# Patient Record
Sex: MALE | Race: WHITE | NOT HISPANIC OR LATINO | Employment: FULL TIME | URBAN - METROPOLITAN AREA
[De-identification: names, ages, dates, MRNs, and addresses within clinical notes are randomized per-mention and may not be internally consistent; named-entity substitution may affect disease eponyms.]

---

## 2017-01-16 ENCOUNTER — OFFICE VISIT (OUTPATIENT)
Dept: INTERNAL MEDICINE | Facility: CLINIC | Age: 37
End: 2017-01-16

## 2017-01-16 DIAGNOSIS — Z00.00 PHYSICAL EXAM: Primary | ICD-10-CM

## 2017-01-16 PROCEDURE — 99201 PR OFFICE/OUTPT VISIT,NEW,LEVL I: CPT | Mod: ,,, | Performed by: INTERNAL MEDICINE

## 2022-06-29 ENCOUNTER — HISTORICAL (OUTPATIENT)
Dept: ADMINISTRATIVE | Facility: HOSPITAL | Age: 42
End: 2022-06-29

## 2023-07-12 ENCOUNTER — HOSPITAL ENCOUNTER (OUTPATIENT)
Dept: RADIOLOGY | Facility: CLINIC | Age: 43
Discharge: HOME | End: 2023-07-12
Attending: NURSE PRACTITIONER
Payer: COMMERCIAL

## 2023-07-12 ENCOUNTER — OFFICE VISIT (OUTPATIENT)
Dept: UROLOGY | Facility: CLINIC | Age: 43
End: 2023-07-12
Payer: COMMERCIAL

## 2023-07-12 VITALS — RESPIRATION RATE: 18 BRPM | BODY MASS INDEX: 30.78 KG/M2 | HEIGHT: 70 IN | WEIGHT: 215 LBS

## 2023-07-12 DIAGNOSIS — R10.9 RIGHT FLANK PAIN: ICD-10-CM

## 2023-07-12 DIAGNOSIS — N20.0 RENAL STONE: ICD-10-CM

## 2023-07-12 DIAGNOSIS — N20.0 KIDNEY STONE: Primary | ICD-10-CM

## 2023-07-12 DIAGNOSIS — N20.0 KIDNEY STONE: ICD-10-CM

## 2023-07-12 DIAGNOSIS — N20.0 RENAL STONE: Primary | ICD-10-CM

## 2023-07-12 LAB
BILIRUBIN, UA POC OHS: NEGATIVE
BLOOD, UA POC OHS: ABNORMAL
CLARITY, UA POC OHS: ABNORMAL
COLOR, UA POC OHS: ABNORMAL
GLUCOSE, UA POC OHS: NEGATIVE
KETONES, UA POC OHS: NEGATIVE
LEUKOCYTES, UA POC OHS: NEGATIVE
NITRITE, UA POC OHS: NEGATIVE
PH, UA POC OHS: 5.5
PROTEIN, UA POC OHS: 30
SPECIFIC GRAVITY, UA POC OHS: >=1.03
UROBILINOGEN, UA POC OHS: 0.2

## 2023-07-12 PROCEDURE — 1159F PR MEDICATION LIST DOCUMENTED IN MEDICAL RECORD: ICD-10-PCS | Mod: CPTII,S$GLB,, | Performed by: NURSE PRACTITIONER

## 2023-07-12 PROCEDURE — 99214 PR OFFICE/OUTPT VISIT, EST, LEVL IV, 30-39 MIN: ICD-10-PCS | Mod: S$GLB,,, | Performed by: NURSE PRACTITIONER

## 2023-07-12 PROCEDURE — 1160F RVW MEDS BY RX/DR IN RCRD: CPT | Mod: CPTII,S$GLB,, | Performed by: NURSE PRACTITIONER

## 2023-07-12 PROCEDURE — 74018 RADEX ABDOMEN 1 VIEW: CPT | Mod: 26,,, | Performed by: RADIOLOGY

## 2023-07-12 PROCEDURE — 3008F BODY MASS INDEX DOCD: CPT | Mod: CPTII,S$GLB,, | Performed by: NURSE PRACTITIONER

## 2023-07-12 PROCEDURE — 1159F MED LIST DOCD IN RCRD: CPT | Mod: CPTII,S$GLB,, | Performed by: NURSE PRACTITIONER

## 2023-07-12 PROCEDURE — 99214 OFFICE O/P EST MOD 30 MIN: CPT | Mod: S$GLB,,, | Performed by: NURSE PRACTITIONER

## 2023-07-12 PROCEDURE — 81003 URINALYSIS AUTO W/O SCOPE: CPT | Mod: QW,S$GLB,, | Performed by: NURSE PRACTITIONER

## 2023-07-12 PROCEDURE — 74018 RADEX ABDOMEN 1 VIEW: CPT | Mod: 26,,, | Performed by: UROLOGY

## 2023-07-12 PROCEDURE — 74018 XR ABDOMEN AP 1 VIEW: ICD-10-PCS | Mod: 26,,, | Performed by: RADIOLOGY

## 2023-07-12 PROCEDURE — 1160F PR REVIEW ALL MEDS BY PRESCRIBER/CLIN PHARMACIST DOCUMENTED: ICD-10-PCS | Mod: CPTII,S$GLB,, | Performed by: NURSE PRACTITIONER

## 2023-07-12 PROCEDURE — 81003 POCT URINALYSIS(INSTRUMENT): ICD-10-PCS | Mod: QW,S$GLB,, | Performed by: NURSE PRACTITIONER

## 2023-07-12 PROCEDURE — 3008F PR BODY MASS INDEX (BMI) DOCUMENTED: ICD-10-PCS | Mod: CPTII,S$GLB,, | Performed by: NURSE PRACTITIONER

## 2023-07-12 PROCEDURE — 74018 XR ABDOMEN AP 1 VIEW: ICD-10-PCS | Mod: 26,,, | Performed by: UROLOGY

## 2023-07-12 RX ORDER — TAMSULOSIN HYDROCHLORIDE 0.4 MG/1
CAPSULE ORAL
COMMUNITY
Start: 2023-07-10

## 2023-07-12 RX ORDER — ONDANSETRON 4 MG/1
8 TABLET, ORALLY DISINTEGRATING ORAL EVERY 6 HOURS PRN
Qty: 30 TABLET | Refills: 0 | Status: SHIPPED | OUTPATIENT
Start: 2023-07-12

## 2023-07-12 RX ORDER — ESCITALOPRAM OXALATE 20 MG/1
20 TABLET ORAL
COMMUNITY
Start: 2023-06-26

## 2023-07-12 RX ORDER — MELOXICAM 15 MG/1
15 TABLET ORAL DAILY
Qty: 15 TABLET | Refills: 0 | Status: SHIPPED | OUTPATIENT
Start: 2023-07-12 | End: 2023-08-12 | Stop reason: ALTCHOICE

## 2023-07-12 NOTE — PROGRESS NOTES
Subjective:       Patient ID: Remi Cabrera is a 42 y.o. male.    Chief Complaint: New Pt and Nephrolithiasis      HPI: 42-year-old male past patient Dr. French history kidney stones presents today with new onset stone type pain right lower quadrant.  Onset Monday.  Saw his primary care gave him tamsulosin 0.4 mg daily referred him back to us.  He presents today continues with intermittent right lower quadrant pain states more so in the evening than daytime.  Afebrile not seeing any blood no dysuria no other urologic concerns.       Past Medical History:   Past Medical History:   Diagnosis Date    Renal stones        Past Surgical Historical:   Past Surgical History:   Procedure Laterality Date    HERNIA REPAIR      as child    URETEROLITHOTOMY          Medications:   Medication List with Changes/Refills   Current Medications    ESCITALOPRAM OXALATE (LEXAPRO) 20 MG TABLET    Take 20 mg by mouth.    TAMSULOSIN (FLOMAX) 0.4 MG CAP    TAKE 1 CAPSULE DAILY FOR 14 DAYS        Past Social History:   Social History     Socioeconomic History    Marital status:    Tobacco Use    Smoking status: Never    Smokeless tobacco: Never   Substance and Sexual Activity    Alcohol use: Not Currently       Allergies:   Review of patient's allergies indicates:   Allergen Reactions    Hydrocodone-acetaminophen         Family History: History reviewed. No pertinent family history.     Review of Systems:  Review of Systems   Constitutional:  Negative for activity change and appetite change.   HENT:  Negative for congestion and dental problem.    Eyes:  Negative for visual disturbance.   Respiratory:  Negative for chest tightness and shortness of breath.    Cardiovascular:  Negative for chest pain.   Gastrointestinal:  Negative for abdominal distention and abdominal pain.   Genitourinary:  Positive for flank pain. Negative for decreased urine volume, difficulty urinating, dysuria, enuresis, frequency, genital sores, hematuria,  penile discharge, penile pain, penile swelling, scrotal swelling, testicular pain and urgency.   Musculoskeletal:  Negative for back pain and neck pain.   Skin:  Negative for color change.   Neurological:  Negative for dizziness.   Hematological:  Negative for adenopathy.   Psychiatric/Behavioral:  Negative for agitation, behavioral problems and confusion.      Physical Exam:  Physical Exam  Constitutional:       Appearance: He is well-developed.   HENT:      Head: Normocephalic.   Eyes:      General: No scleral icterus.  Pulmonary:      Effort: Pulmonary effort is normal.      Breath sounds: Normal breath sounds.   Abdominal:      General: There is no distension.      Palpations: Abdomen is soft.      Tenderness: There is no abdominal tenderness.      Hernia: No hernia is present. There is no hernia in the right inguinal area or left inguinal area.   Genitourinary:     Penis: Normal.       Testes: Normal. Cremasteric reflex is present.   Musculoskeletal:      Cervical back: Normal range of motion.   Skin:     General: Skin is warm and dry.   Neurological:      Mental Status: He is alert and oriented to person, place, and time.       Assessment/Plan:   Right flan librado--urinalysis  or be sees otherwise negative.  KUB--right ureteral stone at approximately L5.  Rx Mobic patient does not tolerate narcotics, continue tamsulosin as well as an Rx for Zofran.  Patient is leaving town tomorrow on vacation.  He will return on the 24th of this month at which time I gave him a written order for a KUB at the [facility] will review the film and if stone is not passed will set him up for ureteroscopy at that time.  Problem List Items Addressed This Visit    None  Visit Diagnoses       Renal stone    -  Primary    Relevant Orders    POCT Urinalysis(Instrument)    X-Ray Abdomen AP 1 View    Right flank pain        Relevant Orders    POCT Urinalysis(Instrument)    X-Ray Abdomen AP 1 View

## 2023-07-25 ENCOUNTER — TELEPHONE (OUTPATIENT)
Dept: UROLOGY | Facility: CLINIC | Age: 43
End: 2023-07-25
Payer: COMMERCIAL

## 2023-07-25 ENCOUNTER — HOSPITAL ENCOUNTER (OUTPATIENT)
Dept: RADIOLOGY | Facility: HOSPITAL | Age: 43
Discharge: HOME | End: 2023-07-25
Attending: NURSE PRACTITIONER
Payer: COMMERCIAL

## 2023-07-25 DIAGNOSIS — N20.0 STONE, KIDNEY: ICD-10-CM

## 2023-07-25 PROCEDURE — 74018 RADEX ABDOMEN 1 VIEW: CPT | Mod: TC

## 2023-07-25 NOTE — TELEPHONE ENCOUNTER
Pt calling to get results for KUB. Pt wants to know if he can go to work tomorrow. 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 Informed pt I would send to provider. ANI

## 2023-07-25 NOTE — TELEPHONE ENCOUNTER
----- Message from Erika Clancy sent at 7/25/2023  3:36 PM CDT -----  Patient is calling in regards to if he able to leave for work in the morning need clearance..Please call him back at [phone]

## 2023-07-25 NOTE — TELEPHONE ENCOUNTER
----- Message from Erika Clancy sent at 7/25/2023 11:23 AM CDT -----  Patient is calling in regards to he has completed his X-ray...Please call him back at [phone]

## 2023-08-12 ENCOUNTER — HOSPITAL ENCOUNTER (EMERGENCY)
Facility: HOSPITAL | Age: 43
Discharge: HOME | End: 2023-08-12
Payer: COMMERCIAL

## 2023-08-12 ENCOUNTER — NURSE TRIAGE (OUTPATIENT)
Dept: ADMINISTRATIVE | Facility: CLINIC | Age: 43
End: 2023-08-12
Payer: COMMERCIAL

## 2023-08-12 VITALS
RESPIRATION RATE: 18 BRPM | DIASTOLIC BLOOD PRESSURE: 87 MMHG | TEMPERATURE: 99 F | HEIGHT: 71 IN | SYSTOLIC BLOOD PRESSURE: 145 MMHG | WEIGHT: 215 LBS | OXYGEN SATURATION: 98 % | BODY MASS INDEX: 30.1 KG/M2 | HEART RATE: 89 BPM

## 2023-08-12 DIAGNOSIS — N20.1 URETEROLITHIASIS: Primary | ICD-10-CM

## 2023-08-12 PROCEDURE — 99285 EMERGENCY DEPT VISIT HI MDM: CPT | Mod: 25

## 2023-08-12 PROCEDURE — 63600175 PHARM REV CODE 636 W HCPCS

## 2023-08-12 PROCEDURE — 96374 THER/PROPH/DIAG INJ IV PUSH: CPT

## 2023-08-12 RX ORDER — NAPROXEN 500 MG/1
500 TABLET ORAL 2 TIMES DAILY
Qty: 20 TABLET | Refills: 0 | Status: SHIPPED | OUTPATIENT
Start: 2023-08-12

## 2023-08-12 RX ORDER — KETOROLAC TROMETHAMINE 30 MG/ML
30 INJECTION, SOLUTION INTRAMUSCULAR; INTRAVENOUS
Status: COMPLETED | OUTPATIENT
Start: 2023-08-12 | End: 2023-08-12

## 2023-08-12 RX ORDER — OXYCODONE AND ACETAMINOPHEN 10; 325 MG/1; MG/1
1 TABLET ORAL EVERY 4 HOURS PRN
Qty: 12 TABLET | Refills: 0 | Status: SHIPPED | OUTPATIENT
Start: 2023-08-12

## 2023-08-12 RX ADMIN — KETOROLAC TROMETHAMINE 30 MG: 30 INJECTION, SOLUTION INTRAMUSCULAR; INTRAVENOUS at 03:08

## 2023-08-12 NOTE — ED PROVIDER NOTES
Encounter Date: 8/12/2023       History     Chief Complaint   Patient presents with    Flank Pain     Patient states right sided flank pain and urinary retention. Patient states hx kidney stones.      42-year-old male with a history of kidney stones presents complaining of sudden onset right flank pain 4 hours ago.  Denies any hematuria or dysuria.  Denies any fever or chills.  There has been no vomiting or diarrhea.    The history is provided by the patient.     Review of patient's allergies indicates:   Allergen Reactions    Hydrocodone-acetaminophen      Past Medical History:   Diagnosis Date    Renal stones      Past Surgical History:   Procedure Laterality Date    HERNIA REPAIR      as child    URETEROLITHOTOMY       History reviewed. No pertinent family history.  Social History     Tobacco Use    Smoking status: Never    Smokeless tobacco: Never   Substance Use Topics    Alcohol use: Not Currently     Review of Systems   Constitutional:  Negative for chills and fever.   HENT:  Negative for sore throat.    Respiratory:  Negative for shortness of breath.    Cardiovascular:  Negative for chest pain.   Gastrointestinal:  Negative for nausea.   Genitourinary:  Positive for flank pain. Negative for dysuria, frequency and hematuria.   Musculoskeletal:  Negative for back pain.   Skin:  Negative for rash.   Neurological:  Negative for weakness.   Hematological:  Does not bruise/bleed easily.   All other systems reviewed and are negative.      Physical Exam     Initial Vitals [08/12/23 0347]   BP Pulse Resp Temp SpO2   (!) 153/93 71 18 99.1 °F (37.3 °C) 98 %      MAP       --         Physical Exam    Nursing note and vitals reviewed.  Constitutional: Vital signs are normal. He appears well-developed and well-nourished. He is cooperative.   HENT:   Head: Normocephalic and atraumatic.   Eyes: Conjunctivae, EOM and lids are normal. Pupils are equal, round, and reactive to light.   Neck: Trachea normal. Neck supple.    Normal range of motion.  Cardiovascular:  Normal rate, regular rhythm, normal heart sounds and intact distal pulses.           Pulmonary/Chest: Breath sounds normal.   Abdominal: Abdomen is soft. Bowel sounds are normal.   Musculoskeletal:         General: Normal range of motion.      Cervical back: Normal, normal range of motion and neck supple.      Lumbar back: Normal.     Neurological: He is alert and oriented to person, place, and time. He has normal strength. Coordination normal.   Skin: Skin is warm, dry and intact. Capillary refill takes less than 2 seconds.   Psychiatric: He has a normal mood and affect. His speech is normal and behavior is normal. Judgment and thought content normal. Cognition and memory are normal.         ED Course   Procedures  Labs Reviewed   URINALYSIS, REFLEX TO URINE CULTURE          Imaging Results              CT Abdomen Pelvis  Without Contrast (Final result)  Result time 08/12/23 04:06:47      Final result by Mary Kay Helm III, MD (08/12/23 04:06:47)                   Impression:      1. A 5-6 mm, radiopaque stone is noted within the distal most aspect of the right ureter (UVJ) with what appears to be at least a mid grade obstruction.  See above comments.      Electronically signed by: Mary Kay Helm  Date:    08/12/2023  Time:    04:06               Narrative:    EXAMINATION:  CT ABDOMEN PELVIS WITHOUT CONTRAST    CLINICAL HISTORY AND TECHNIQUE:  Kevin Kee, RT on 8/12/2023  4:04 AM    PROCEDURE: CT ABD/PEL WO CONT    CLINICAL HX: ER    RT FLANK PAIN AND URINARY RETENTION    PMH: HX OF KIDNEY STONES - UNSPECIFIED HERNIA REPAIR    IV CONTRAST: NONE    ORAL CONTRAST: NONE    RECTAL CONTRAST: NONE    AXIAL IMAGING @ 5MM INTERVALS W/MULTIPLANAR RECONSTRUCTION OF IMAGES    TOTAL IMAGE NUMBER: [id number]    CTDIvol(mGy): HEAD:  BODY:6.9    DLP(mGycm): HEAD:  BODY: 445    # CT'S LAST 12 MONTHS:1    TECH: AJR    PT TRANSPORTED W/O INCIDENT    This patient has had 1 CT and nuclear medicine  scans performed within the last 12 months.    The following DOSE REDUCTION TECHNIQUES are used for all CT scans at [facility]:    1. Automated exposure control.  2. Adjustment of the mA and/or kv according to patient size.  3. Use of iterative reconstruction technique.    COMPARISON:  None    FINDINGS:  Liver: No clinically significant abnormalities are noted.    Gallbladder/biliary system: No clinically significant abnormalities are noted.    Spleen: No clinically significant abnormalities are noted.    Adrenal glands: No clinically significant abnormalities are noted.    Pancreas: No clinically significant abnormalities are noted.    Kidneys/ureters: 5-6 mm radiopaque stone is noted within the distal most aspect of the right ureter (level of the UVJ) with moderate dilatation of the right ureter and pelvicaliceal system compatible with at least a mid grade obstruction.    Urinary bladder: No clinically significant abnormalities are noted.    Prostate gland and seminal vesicles: No significant abnormalities are noted.    GI tract: Unopacified loops of large and small bowel as well as the gastric lumen and appendix are difficult to evaluate with no definite abnormalities noted.    Vascular structures: No clinically significant abnormalities are noted.    Musculoskeletal structures: A mild, levoconvex, scoliotic curvature of the lumbar spine is present with mild degenerative changes noted involving the lumbar spine.    Miscellaneous: N/A                                       Medications   ketorolac injection 30 mg (30 mg Intravenous Given 8/12/23 0353)     Medical Decision Making:   Initial Assessment:   Right flank pain x4 hours, history kidney stones  Differential Diagnosis:   Ureterolithiasis  ED Management:  Toradol IV  CT abdomen pelvis, UA             ED Course as of 08/12/23 0412   Sat Aug 12, 2023   0407 CT Abdomen Pelvis  Without Contrast  5-6 mm right UVJ stone [TM]      ED Course User  Index  [TM] Akhil Lowery MD                 Clinical Impression:   Final diagnoses:  [N20.1] Ureterolithiasis (Primary)        ED Disposition Condition    Discharge Good          ED Prescriptions       Medication Sig Dispense Start Date End Date Auth. Provider    naproxen (NAPROSYN) 500 MG tablet Take 1 tablet (500 mg total) by mouth 2 (two) times daily. 20 tablet 8/12/2023 -- Akhil Lowery MD    oxyCODONE-acetaminophen (PERCOCET)  mg per tablet Take 1 tablet by mouth every 4 (four) hours as needed for Pain. 12 tablet 8/12/2023 -- Akhil Lowery MD          Follow-up Information       Follow up With Specialties Details Why Contact Info    Sreekanth Barkley MD Family Medicine Call in 2 days  [address]  [facility] [address]  [phone]               Akhil Lowery MD  08/12/23 3218

## 2023-08-12 NOTE — TELEPHONE ENCOUNTER
"[place]    PCP:  Dr. Sreekanth Barkley    Pt escalated to [facility] queue for "KIDNEY STONES. WENT ER LAST NIGHT. ER TOLD HIM THAT HIS KIDNEY IS SWOLLEN".  Spoke with Wife, Veronika Cabrera, on pts behalf and with pt directly.  Wife reports seen in the ED today for kidney stone that is blocking the ureter and Rt kidney is enlarged.  C/O Rt flank/abdominal pain rated 100/10 and urinary retention.  Denies blood in urine, vomiting, leg weakness, burning with urination, and fever.  Per protocol, care advised is go to the ED now.  Pt and Wife VU.  Advised to call for worsening/questions/concerns.  VU.     Reason for Disposition   [1] SEVERE pain (e.g., excruciating, scale 8-10) AND [2] present > 1 hour    Additional Information   Negative: Passed out (i.e., lost consciousness, collapsed and was not responding)   Negative: Shock suspected (e.g., cold/pale/clammy skin, too weak to stand, low BP, rapid pulse)   Negative: Difficult to awaken or acting confused (e.g., disoriented, slurred speech)   Negative: Sounds like a life-threatening emergency to the triager    Protocols used: Flank Pain-A-AH    "

## 2023-08-14 ENCOUNTER — TELEPHONE (OUTPATIENT)
Dept: UROLOGY | Facility: CLINIC | Age: 43
End: 2023-08-14
Payer: COMMERCIAL

## 2023-08-14 NOTE — TELEPHONE ENCOUNTER
Called pt to check in since pt was last seen in the ED on 812/2023. Will try to contact pt again to check in. ED

## 2023-08-14 NOTE — TELEPHONE ENCOUNTER
Patient states that about 5 weeks ago he had a kub with you and a stone was seen, then he had a follow up x-ray which didn't show it so you guys thought he passed it. Well he recently went to [facility] on 8/12/23 and had a CT scan which showed the stone is still there, and it's causing a lot of discomfort for him. He would like to have a ESWL or a procedure that isn't done by going through penile entrance.

## 2023-08-14 NOTE — TELEPHONE ENCOUNTER
----- Message from Joey Deleon sent at 8/14/2023  8:50 AM CDT -----  Contact: Remi Khalil is needing a call back in regards to some concerns. Please give him a call back at [phone]

## 2023-08-15 ENCOUNTER — OFFICE VISIT (OUTPATIENT)
Dept: UROLOGY | Facility: CLINIC | Age: 43
End: 2023-08-15
Payer: COMMERCIAL

## 2023-08-15 VITALS
DIASTOLIC BLOOD PRESSURE: 70 MMHG | HEIGHT: 71 IN | WEIGHT: 215 LBS | BODY MASS INDEX: 30.1 KG/M2 | SYSTOLIC BLOOD PRESSURE: 123 MMHG | HEART RATE: 68 BPM

## 2023-08-15 DIAGNOSIS — N20.0 RENAL STONE: Primary | ICD-10-CM

## 2023-08-15 DIAGNOSIS — N20.0 KIDNEY STONE: ICD-10-CM

## 2023-08-15 DIAGNOSIS — N20.0 KIDNEY STONE: Primary | ICD-10-CM

## 2023-08-15 DIAGNOSIS — N20.0 RENAL STONE: ICD-10-CM

## 2023-08-15 PROCEDURE — 1159F PR MEDICATION LIST DOCUMENTED IN MEDICAL RECORD: ICD-10-PCS | Mod: CPTII,S$GLB,, | Performed by: UROLOGY

## 2023-08-15 PROCEDURE — 3074F PR MOST RECENT SYSTOLIC BLOOD PRESSURE < 130 MM HG: ICD-10-PCS | Mod: CPTII,S$GLB,, | Performed by: UROLOGY

## 2023-08-15 PROCEDURE — 3008F PR BODY MASS INDEX (BMI) DOCUMENTED: ICD-10-PCS | Mod: CPTII,S$GLB,, | Performed by: UROLOGY

## 2023-08-15 PROCEDURE — 3078F PR MOST RECENT DIASTOLIC BLOOD PRESSURE < 80 MM HG: ICD-10-PCS | Mod: CPTII,S$GLB,, | Performed by: UROLOGY

## 2023-08-15 PROCEDURE — 1159F MED LIST DOCD IN RCRD: CPT | Mod: CPTII,S$GLB,, | Performed by: UROLOGY

## 2023-08-15 PROCEDURE — 99215 OFFICE O/P EST HI 40 MIN: CPT | Mod: S$GLB,,, | Performed by: UROLOGY

## 2023-08-15 PROCEDURE — 99215 PR OFFICE/OUTPT VISIT, EST, LEVL V, 40-54 MIN: ICD-10-PCS | Mod: S$GLB,,, | Performed by: UROLOGY

## 2023-08-15 PROCEDURE — 3074F SYST BP LT 130 MM HG: CPT | Mod: CPTII,S$GLB,, | Performed by: UROLOGY

## 2023-08-15 PROCEDURE — 3008F BODY MASS INDEX DOCD: CPT | Mod: CPTII,S$GLB,, | Performed by: UROLOGY

## 2023-08-15 PROCEDURE — 3078F DIAST BP <80 MM HG: CPT | Mod: CPTII,S$GLB,, | Performed by: UROLOGY

## 2023-08-15 RX ORDER — KETOROLAC TROMETHAMINE 10 MG/1
TABLET, FILM COATED ORAL
COMMUNITY
Start: 2023-08-12

## 2023-08-15 RX ORDER — OXYCODONE AND ACETAMINOPHEN 7.5; 325 MG/1; MG/1
1 TABLET ORAL EVERY 6 HOURS PRN
COMMUNITY
Start: 2023-08-12

## 2023-08-15 NOTE — TELEPHONE ENCOUNTER
Spoke with pt, went to [facility] first, ct report in epic. Went [facility] after that. I advised we could not view those results. Pt has appointment today with Dr Linn. I advised Dr Linn would see pt and go from there.

## 2023-08-15 NOTE — PROGRESS NOTES
Subjective:       Patient ID: Remi Cabrera is a 42 y.o. male.    Chief Complaint: Nephrolithiasis      HPI: 42-year-old male patient presenting to the clinic of right flank and lower abdominal pain.  He has history of stones requiring ureteroscopy. Patient has been hurting for 5 weeks. Recent CT imaging done on 08/12/2020 notes a 5-6 mm stone in the distal right ureter at the UVJ with mid grade obstruction.        Past Medical History:   Past Medical History:   Diagnosis Date    Renal stones        Past Surgical Historical:   Past Surgical History:   Procedure Laterality Date    HERNIA REPAIR      as child    URETEROLITHOTOMY          Medications:   Medication List with Changes/Refills   Current Medications    ESCITALOPRAM OXALATE (LEXAPRO) 20 MG TABLET    Take 20 mg by mouth.    KETOROLAC (TORADOL) 10 MG TABLET    TAKE 1 TABLET BY MOUTH THREE TIMES A DAY AS NEEDED FOR PAIN FOR 5 DAYS    NAPROXEN (NAPROSYN) 500 MG TABLET    Take 1 tablet (500 mg total) by mouth 2 (two) times daily.    ONDANSETRON (ZOFRAN-ODT) 4 MG TBDL    Take 2 tablets (8 mg total) by mouth every 6 (six) hours as needed (N/V).    OXYCODONE-ACETAMINOPHEN (PERCOCET)  MG PER TABLET    Take 1 tablet by mouth every 4 (four) hours as needed for Pain.    OXYCODONE-ACETAMINOPHEN (PERCOCET) 7.5-325 MG PER TABLET    Take 1 tablet by mouth every 6 (six) hours as needed.    TAMSULOSIN (FLOMAX) 0.4 MG CAP    TAKE 1 CAPSULE DAILY FOR 14 DAYS        Past Social History:   Social History     Socioeconomic History    Marital status:    Tobacco Use    Smoking status: Never    Smokeless tobacco: Never   Substance and Sexual Activity    Alcohol use: Not Currently       Allergies:   Review of patient's allergies indicates:   Allergen Reactions    Hydrocodone      Other reaction(s): makes pt mean    Hydrocodone-acetaminophen         Family History: History reviewed. No pertinent family history.     Review of Systems:  Review of Systems   Constitutional:  Negative.    HENT: Negative.     Eyes: Negative.    Respiratory: Negative.     Cardiovascular: Negative.    Gastrointestinal: Negative.    Endocrine: Negative.    Genitourinary:  Positive for flank pain and hematuria.   Skin: Negative.    Allergic/Immunologic: Negative.    Neurological: Negative.    Hematological: Negative.    Psychiatric/Behavioral: Negative.         Physical Exam:  Physical Exam  Constitutional:       Appearance: He is normal weight.   HENT:      Head: Normocephalic.      Nose: Nose normal.      Mouth/Throat:      Mouth: Mucous membranes are moist.      Pharynx: Oropharynx is clear.   Eyes:      Extraocular Movements: Extraocular movements intact.      Conjunctiva/sclera: Conjunctivae normal.      Pupils: Pupils are equal, round, and reactive to light.   Cardiovascular:      Rate and Rhythm: Normal rate and regular rhythm.      Pulses: Normal pulses.      Heart sounds: Normal heart sounds.   Pulmonary:      Effort: Pulmonary effort is normal.      Breath sounds: Normal breath sounds.   Abdominal:      General: Abdomen is flat. Bowel sounds are normal.      Palpations: Abdomen is soft.      Hernia: There is no hernia in the right inguinal area or left inguinal area.   Genitourinary:     Penis: Normal. No phimosis, paraphimosis, hypospadias, erythema, tenderness or discharge.       Testes: Normal.         Right: Mass, tenderness or swelling not present. Right testis is descended. Cremasteric reflex is present.          Left: Mass, tenderness or swelling not present. Left testis is descended. Cremasteric reflex is present.       Prostate: Normal.      Rectum: Normal.   Musculoskeletal:         General: Normal range of motion.      Cervical back: Normal range of motion and neck supple.   Lymphadenopathy:      Lower Body: No right inguinal adenopathy. No left inguinal adenopathy.   Skin:     General: Skin is warm and dry.      Capillary Refill: Capillary refill takes less than 2 seconds.    Neurological:      General: No focal deficit present.      Mental Status: He is alert and oriented to person, place, and time. Mental status is at baseline.   Psychiatric:         Mood and Affect: Mood normal.         Behavior: Behavior normal.         Thought Content: Thought content normal.         Judgment: Judgment normal.         Assessment/Plan:     Nephrolithiasis:  Patient will be set up for right ureteroscopy at the next available date. Recent CT imaging done on 08/12/2020 notes a 5-6 mm stone in the distal right ureter at the UVJ with mid grade obstruction. Patient denies any fever at this time. He has medication for pain control.     I, Dr. Braydon Linn have seen and personally evaluated the patient. I have formulated the plan reviewed all pertinent imaging and clinical data.  I agree with the nurse practitioner's assessment, and I have personally formulated the plan for this patient's care as described by the midlevel.    Problem List Items Addressed This Visit    None

## 2023-08-15 NOTE — PROGRESS NOTES
Patient educated, consent signed, pre-admission paperwork given. Patient verbalized understanding. DOS 8/17/23 at [facility]. Cathy

## 2023-08-16 LAB
ANION GAP SERPL CALC-SCNC: 10 MMOL/L (ref 3–11)
APPEARANCE, UA: CLEAR
BACTERIA SPEC CULT: ABNORMAL /HPF
BASOPHILS NFR BLD: 0.5 % (ref 0–3)
BILIRUB UR QL STRIP: NEGATIVE MG/DL
BUN SERPL-MCNC: 16 MG/DL (ref 7–18)
BUN/CREAT SERPL: 15.38 RATIO (ref 7–18)
CALCIUM SERPL-MCNC: 8.7 MG/DL (ref 8.8–10.5)
CHLORIDE SERPL-SCNC: 107 MMOL/L (ref 100–108)
CO2 SERPL-SCNC: 26 MMOL/L (ref 21–32)
COLOR UR: ABNORMAL
CREAT SERPL-MCNC: 1.04 MG/DL (ref 0.7–1.3)
EOSINOPHIL NFR BLD: 2.8 % (ref 1–3)
ERYTHROCYTE [DISTWIDTH] IN BLOOD BY AUTOMATED COUNT: 12.3 % (ref 12.5–18)
GFR ESTIMATION: > 60
GLUCOSE (UA): NORMAL MG/DL
GLUCOSE SERPL-MCNC: 98 MG/DL (ref 70–110)
HCT VFR BLD AUTO: 41.9 % (ref 42–52)
HGB BLD-MCNC: 14.2 G/DL (ref 14–18)
HGB UR QL STRIP: 50 /UL
KETONES UR QL STRIP: NEGATIVE MG/DL
LEUKOCYTE ESTERASE UR QL STRIP: NEGATIVE /UL
LYMPHOCYTES NFR BLD: 27.8 % (ref 25–40)
MCH RBC QN AUTO: 30.9 PG (ref 27–31.2)
MCHC RBC AUTO-ENTMCNC: 33.9 G/DL (ref 31.8–35.4)
MCV RBC AUTO: 91.1 FL (ref 80–97)
MONOCYTES NFR BLD: 8.9 % (ref 1–15)
NEUTROPHILS # BLD AUTO: 4.42 10*3/UL (ref 1.8–7.7)
NEUTROPHILS NFR BLD: 59.6 % (ref 37–80)
NITRITE UR QL STRIP: NEGATIVE
NUCLEATED RED BLOOD CELLS: 0 %
PH UR STRIP: 6 PH (ref 5–9)
PLATELETS: 193 10*3/UL (ref 142–424)
POTASSIUM SERPL-SCNC: 4.4 MMOL/L (ref 3.6–5.2)
PROT UR QL STRIP: NEGATIVE MG/DL
RBC # BLD AUTO: 4.6 10*6/UL (ref 4.7–6.1)
RBC #/AREA URNS HPF: ABNORMAL /HPF (ref 0–2)
SERVICE COMMENT 03: ABNORMAL
SODIUM BLD-SCNC: 143 MMOL/L (ref 135–145)
SP GR UR STRIP: 1.02 (ref 1–1.03)
SPECIMEN COLLECTION METHOD, URINE: ABNORMAL
SQUAMOUS EPITHELIAL, UA: ABNORMAL /LPF
UROBILINOGEN UR STRIP-ACNC: NORMAL MG/DL
WBC # BLD: 7.4 10*3/UL (ref 4.6–10.2)
WBC #/AREA URNS HPF: ABNORMAL /HPF (ref 0–5)

## 2023-08-17 ENCOUNTER — OUTSIDE PLACE OF SERVICE (OUTPATIENT)
Dept: UROLOGY | Facility: CLINIC | Age: 43
End: 2023-08-17
Payer: COMMERCIAL

## 2023-08-17 ENCOUNTER — TELEPHONE (OUTPATIENT)
Dept: UROLOGY | Facility: CLINIC | Age: 43
End: 2023-08-17
Payer: COMMERCIAL

## 2023-08-17 DIAGNOSIS — N20.0 KIDNEY STONE: Primary | ICD-10-CM

## 2023-08-17 PROCEDURE — 74420 UROGRAPHY RTRGR +-KUB: CPT | Mod: 26,,, | Performed by: UROLOGY

## 2023-08-17 PROCEDURE — 52352 PR CYSTO/URETERO/PYELOSCOPY, CALCULUS TX: ICD-10-PCS | Mod: RT,,, | Performed by: UROLOGY

## 2023-08-17 PROCEDURE — 52332 CYSTOSCOPY AND TREATMENT: CPT | Mod: 51,RT,, | Performed by: UROLOGY

## 2023-08-17 PROCEDURE — 74420 PR  X-RAY RETROGRADE PYELOGRAM: ICD-10-PCS | Mod: 26,,, | Performed by: UROLOGY

## 2023-08-17 PROCEDURE — 52332 PR CYSTOSCOPY,INSERT URETERAL STENT: ICD-10-PCS | Mod: 51,RT,, | Performed by: UROLOGY

## 2023-08-17 PROCEDURE — 52352 CYSTOURETERO W/STONE REMOVE: CPT | Mod: RT,,, | Performed by: UROLOGY

## 2023-08-17 RX ORDER — CIPROFLOXACIN 500 MG/1
500 TABLET ORAL 2 TIMES DAILY
Qty: 6 TABLET | Refills: 0 | Status: SHIPPED | OUTPATIENT
Start: 2023-08-17 | End: 2023-08-20

## 2023-08-17 NOTE — TELEPHONE ENCOUNTER
----- Message from Ananya Washington sent at 8/17/2023  1:21 PM CDT -----  Contact: Remi  Patient is calling in regards to surgery updates. Reports after kidney stone removal having tape on penis. States it's too tight and when he's urinating, its pulling onto the stent causing pain. Would like advise on what to do and if he can replace the tape and re apply it looser. Please advise at .[phone].

## 2023-08-17 NOTE — TELEPHONE ENCOUNTER
Attempted to return call, left message. On message instructed pt he can reapply looser but to be very careful not to pull string. lpn

## 2023-08-18 ENCOUNTER — TELEPHONE (OUTPATIENT)
Dept: UROLOGY | Facility: CLINIC | Age: 43
End: 2023-08-18
Payer: COMMERCIAL

## 2023-08-18 NOTE — TELEPHONE ENCOUNTER
Informed pt that he does not need to continue flomax as it was only prescribed to help with passing stone and he has since has URS. Informed pt to pull stent at 1 week and f/u prn. Pt verbalized all understanding. HMLpn

## 2023-08-18 NOTE — TELEPHONE ENCOUNTER
----- Message from Gabby Deleon sent at 8/18/2023 10:21 AM CDT -----  Contact: Remi  .Type:  Patient Returning Call    Who Called:Remi  Who Left Message for Patient:nurse  Does the patient know what this is regarding?:yes  Would the patient rather a call back or a response via [facility]? Call back  Best Call Back Number:[phone]  Additional Information: pt wants to know if he can still take tamsulosin (FLOMAX) 0.4 mg Cap        Thanks  SW

## 2023-08-21 ENCOUNTER — TELEPHONE (OUTPATIENT)
Dept: UROLOGY | Facility: CLINIC | Age: 43
End: 2023-08-21
Payer: COMMERCIAL

## 2023-08-21 LAB
CALCULI COMPOSITION: NORMAL
CALCULI DESCRIPTION: NORMAL
CALCULI MASS: 49 MG
CALCULI PDF: NORMAL

## 2023-08-21 NOTE — TELEPHONE ENCOUNTER
Contacted pt, he has a self pull stent that he says he will not be able to pull on his own. Appt scheduled/ BJP    ----- Message from Gabby Deleon sent at 8/21/2023 11:12 AM CDT -----  Contact: Remi Granger is calling in regards to getting the stint removed today.please call back at [phone]          Thanks  CHIRAG